# Patient Record
Sex: MALE | Race: ASIAN | Employment: OTHER | URBAN - METROPOLITAN AREA
[De-identification: names, ages, dates, MRNs, and addresses within clinical notes are randomized per-mention and may not be internally consistent; named-entity substitution may affect disease eponyms.]

---

## 2020-08-31 ENCOUNTER — TELEPHONE (OUTPATIENT)
Dept: CASE MANAGEMENT | Age: 70
End: 2020-08-31

## 2020-10-02 ENCOUNTER — TELEPHONE (OUTPATIENT)
Dept: CASE MANAGEMENT | Age: 70
End: 2020-10-02

## 2021-10-22 ENCOUNTER — LAB ENCOUNTER (OUTPATIENT)
Dept: LAB | Facility: HOSPITAL | Age: 71
End: 2021-10-22
Attending: INTERNAL MEDICINE
Payer: MEDICARE

## 2021-10-22 ENCOUNTER — OFFICE VISIT (OUTPATIENT)
Dept: INTERNAL MEDICINE CLINIC | Facility: CLINIC | Age: 71
End: 2021-10-22
Payer: MEDICARE

## 2021-10-22 VITALS
HEIGHT: 72 IN | OXYGEN SATURATION: 96 % | HEART RATE: 74 BPM | DIASTOLIC BLOOD PRESSURE: 78 MMHG | BODY MASS INDEX: 29.66 KG/M2 | SYSTOLIC BLOOD PRESSURE: 132 MMHG | WEIGHT: 219 LBS | TEMPERATURE: 99 F

## 2021-10-22 DIAGNOSIS — Z12.5 PROSTATE CANCER SCREENING: ICD-10-CM

## 2021-10-22 DIAGNOSIS — Z00.00 GENERAL MEDICAL EXAM: ICD-10-CM

## 2021-10-22 DIAGNOSIS — H53.8 BLURRED VISION: ICD-10-CM

## 2021-10-22 DIAGNOSIS — Z85.048 HISTORY OF RECTAL CANCER: Primary | ICD-10-CM

## 2021-10-22 DIAGNOSIS — N28.1 RENAL CYST: ICD-10-CM

## 2021-10-22 DIAGNOSIS — H93.13 TINNITUS OF BOTH EARS: ICD-10-CM

## 2021-10-22 DIAGNOSIS — Z23 NEEDS FLU SHOT: ICD-10-CM

## 2021-10-22 DIAGNOSIS — L85.3 DRY SKIN: ICD-10-CM

## 2021-10-22 PROCEDURE — 36415 COLL VENOUS BLD VENIPUNCTURE: CPT | Performed by: INTERNAL MEDICINE

## 2021-10-22 PROCEDURE — 85025 COMPLETE CBC W/AUTO DIFF WBC: CPT | Performed by: INTERNAL MEDICINE

## 2021-10-22 PROCEDURE — G0008 ADMIN INFLUENZA VIRUS VAC: HCPCS | Performed by: INTERNAL MEDICINE

## 2021-10-22 PROCEDURE — 80053 COMPREHEN METABOLIC PANEL: CPT | Performed by: INTERNAL MEDICINE

## 2021-10-22 PROCEDURE — 3078F DIAST BP <80 MM HG: CPT | Performed by: INTERNAL MEDICINE

## 2021-10-22 PROCEDURE — 90662 IIV NO PRSV INCREASED AG IM: CPT | Performed by: INTERNAL MEDICINE

## 2021-10-22 PROCEDURE — 83036 HEMOGLOBIN GLYCOSYLATED A1C: CPT | Performed by: INTERNAL MEDICINE

## 2021-10-22 PROCEDURE — 3008F BODY MASS INDEX DOCD: CPT | Performed by: INTERNAL MEDICINE

## 2021-10-22 PROCEDURE — 3075F SYST BP GE 130 - 139MM HG: CPT | Performed by: INTERNAL MEDICINE

## 2021-10-22 PROCEDURE — 99204 OFFICE O/P NEW MOD 45 MIN: CPT | Performed by: INTERNAL MEDICINE

## 2021-10-22 NOTE — PROGRESS NOTES
HPI:    Patient ID: Vandana Durand is a 73year old male. HPI    Patient is her to establish care. He is prediabetic,  has history of rectal Ca in 2013, s/p colorectal surgery , in remission. He had left renal stone.   He had medical care at [facility] Skin:        Dry lips   Neurological: Negative for dizziness. Psychiatric/Behavioral: Negative for behavioral problems and sleep disturbance. No current outpatient medications on file.      Allergies:Not on File   PHYSICAL EXAM:      10/22/2 deficit present. Mental Status: He is alert and oriented to person, place, and time.       Gait: Gait normal.   Psychiatric:         Judgment: Judgment normal.                 ASSESSMENT/PLAN:     History of rectal Ca  Obtain old records including rece

## 2021-10-23 PROBLEM — Z85.048 HISTORY OF RECTAL CANCER: Status: ACTIVE | Noted: 2021-10-23

## 2021-10-23 PROBLEM — N28.1 RENAL CYST: Status: ACTIVE | Noted: 2021-10-23

## 2021-10-29 ENCOUNTER — OFFICE VISIT (OUTPATIENT)
Dept: OTOLARYNGOLOGY | Facility: CLINIC | Age: 71
End: 2021-10-29
Payer: MEDICARE

## 2021-10-29 VITALS — WEIGHT: 219 LBS | BODY MASS INDEX: 29.66 KG/M2 | HEIGHT: 72 IN

## 2021-10-29 DIAGNOSIS — H61.23 BILATERAL IMPACTED CERUMEN: Primary | ICD-10-CM

## 2021-10-29 PROCEDURE — 3008F BODY MASS INDEX DOCD: CPT | Performed by: OTOLARYNGOLOGY

## 2021-10-29 PROCEDURE — 69210 REMOVE IMPACTED EAR WAX UNI: CPT | Performed by: OTOLARYNGOLOGY

## 2021-10-29 NOTE — PROGRESS NOTES
Tosha Farias is a 73year old male.   Patient presents with:  Ear Problem: Pt is here for ear cleaning       HISTORY OF PRESENT ILLNESS    Patient presents for cerumen removal. No other complaints or concerns at this time    Social History    Socioeconomic H Normal, Left: Normal.   Skin Normal Inspection - Normal.                              Canals:  Right: Canal reveals cerumen impaction,   Left: Canal reveals cerumen impaction,     Tympanic Membranes:  Right: Normal tympanic membrane.    Left: Normal tympani

## 2021-11-01 ENCOUNTER — TELEPHONE (OUTPATIENT)
Dept: INTERNAL MEDICINE CLINIC | Facility: CLINIC | Age: 71
End: 2021-11-01

## 2021-11-01 NOTE — TELEPHONE ENCOUNTER
Spouse came to the office stating husbands test results are incomplete and would pcp to call her back.

## 2021-11-02 ENCOUNTER — HOSPITAL ENCOUNTER (OUTPATIENT)
Dept: ULTRASOUND IMAGING | Facility: HOSPITAL | Age: 71
Discharge: HOME | End: 2021-11-02
Attending: INTERNAL MEDICINE
Payer: MEDICARE

## 2021-11-02 DIAGNOSIS — N28.1 RENAL CYST: ICD-10-CM

## 2021-11-02 PROCEDURE — 76770 US EXAM ABDO BACK WALL COMP: CPT | Performed by: INTERNAL MEDICINE

## 2021-11-03 ENCOUNTER — LAB ENCOUNTER (OUTPATIENT)
Dept: LAB | Facility: REFERENCE LAB | Age: 71
End: 2021-11-03
Attending: INTERNAL MEDICINE
Payer: MEDICARE

## 2021-11-03 PROCEDURE — 80061 LIPID PANEL: CPT | Performed by: INTERNAL MEDICINE

## 2021-11-03 PROCEDURE — 36415 COLL VENOUS BLD VENIPUNCTURE: CPT | Performed by: INTERNAL MEDICINE

## 2021-11-16 ENCOUNTER — TELEPHONE (OUTPATIENT)
Dept: INTERNAL MEDICINE CLINIC | Facility: CLINIC | Age: 71
End: 2021-11-16

## 2021-11-16 NOTE — TELEPHONE ENCOUNTER
----- Message from Radha Lockett MD sent at 11/7/2021  4:06 PM CST -----  Please call. Renal ultrasound showed small kidney stone. It is non-obstructive. It is important to maintain adequate hydration. He has 8 mm simple cyst. We will monitor.

## 2021-11-19 NOTE — TELEPHONE ENCOUNTER
Encounter from last week--    Patient and wife came in wanting results release to them by  in mandarin. Patient wife wanted only results given by Norah Campos and able to right down the results in mandarin on the printed result sheet.  Nurse handed the

## 2021-12-07 ENCOUNTER — OFFICE VISIT (OUTPATIENT)
Dept: INTERNAL MEDICINE CLINIC | Facility: CLINIC | Age: 71
End: 2021-12-07
Payer: MEDICARE

## 2021-12-07 VITALS
SYSTOLIC BLOOD PRESSURE: 110 MMHG | DIASTOLIC BLOOD PRESSURE: 70 MMHG | HEART RATE: 74 BPM | HEIGHT: 72 IN | OXYGEN SATURATION: 95 % | BODY MASS INDEX: 29.8 KG/M2 | WEIGHT: 220 LBS

## 2021-12-07 DIAGNOSIS — N20.0 RENAL CALCULUS: ICD-10-CM

## 2021-12-07 DIAGNOSIS — Z00.00 ENCOUNTER FOR ANNUAL HEALTH EXAMINATION: Primary | ICD-10-CM

## 2021-12-07 DIAGNOSIS — Z85.048 HISTORY OF RECTAL CANCER: ICD-10-CM

## 2021-12-07 DIAGNOSIS — N28.1 RENAL CYST: ICD-10-CM

## 2021-12-07 PROCEDURE — 3074F SYST BP LT 130 MM HG: CPT | Performed by: INTERNAL MEDICINE

## 2021-12-07 PROCEDURE — 96160 PT-FOCUSED HLTH RISK ASSMT: CPT | Performed by: INTERNAL MEDICINE

## 2021-12-07 PROCEDURE — 3078F DIAST BP <80 MM HG: CPT | Performed by: INTERNAL MEDICINE

## 2021-12-07 PROCEDURE — G0438 PPPS, INITIAL VISIT: HCPCS | Performed by: INTERNAL MEDICINE

## 2021-12-07 PROCEDURE — G0009 ADMIN PNEUMOCOCCAL VACCINE: HCPCS | Performed by: INTERNAL MEDICINE

## 2021-12-07 PROCEDURE — 90732 PPSV23 VACC 2 YRS+ SUBQ/IM: CPT | Performed by: INTERNAL MEDICINE

## 2021-12-07 PROCEDURE — 3008F BODY MASS INDEX DOCD: CPT | Performed by: INTERNAL MEDICINE

## 2021-12-07 PROCEDURE — 99397 PER PM REEVAL EST PAT 65+ YR: CPT | Performed by: INTERNAL MEDICINE

## 2021-12-08 NOTE — PROGRESS NOTES
HPI:    Patient ID: Jadon Fraga is a 73year old male. HPI     Luh Pain is  prediabetic,  has history of rectal Ca in 2013, s/p colorectal surgery , in remission.  He had left renal stone      Dr. Naseem Melvin heme onc    CEA slightly elevated      No curr

## 2021-12-10 NOTE — PATIENT INSTRUCTIONS
[facility] SCREENING SCHEDULE   Tests on this list are recommended by your physician but may not be covered, or covered at this frequency, by your insurer. Please check with your insurance carrier before scheduling to verify coverage.    PREVENTATIVE [id number]) covered once after 65 Prevnar 13: -    [id number]: 12/07/2021     No recommendations at this time    Hepatitis B One screening covered for patients with certain risk factors   -  No recommendations at this time    Tetanus Toxoid Not covered b Cardiovascular Disease Screening    Lipid Panel  Cholesterol  Lipoprotein (HDL)  Triglycerides Covered every 5 years for all Medicare beneficiaries without apparent signs or symptoms of cardiovascular disease Lab Results   Component Value Date    CHOLEST Never done        Recommended Websites for Advanced Directives    [employer]. org/publications/Documents/personal_dec. pdf  An information packet, including necessary form from the [address] website. http://www. [place]state. [place]/

## 2021-12-10 NOTE — PROGRESS NOTES
HPI:   Merrilee Osgood is a 73year old male who presents for a Medicare Subsequent Annual Wellness visit (Pt already had Initial Annual Wellness). Patient is a retired . H/o rectal ca in 2013, Status post resection.  Followed by Oncologist, standard forms performed Face to Face with patient and Family/surrogate (if present), and forms available to patient in AVS       He has never smoked tobacco.    CAGE Alcohol Screen Not needed    Cage screening not needed because reported NO to Alcohol use pain   LUNGS: denies shortness of breath with exertion  CARDIOVASCULAR: denies chest pain on exertion  GI: denies abdominal pain, denies heartburn  : 0 per night nocturia, no complaint of urinary incontinence  MUSCULOSKELETAL: denies back pain  NEURO: de texture, turgor normal, no rashes or lesions   Lymph nodes: Cervical, supraclavicular, and axillary nodes normal   Neurologic: Normal            Vaccination History     Immunization History   Administered Date(s) Administered   • Covid-19 Vaccine [employer] Documentation:   [facility] SCREENING SCHEDULE   Tests on this list are recommended by your physician but may not be covered, or covered at this frequency, by your insurer. Please check with your insurance carrier before scheduling to verify coverage. ([id number] & [id number]) covered once after 65 Prevnar 13: -    [id number]: 12/07/2021     No recommendations at this time    Hepatitis B One screening covered for patients with certain risk factors   -  No recommendations at this time    Tetanus Toxoid

## 2022-01-12 ENCOUNTER — VIRTUAL PHONE E/M (OUTPATIENT)
Dept: INTERNAL MEDICINE CLINIC | Facility: CLINIC | Age: 72
End: 2022-01-12
Payer: MEDICARE

## 2022-01-12 DIAGNOSIS — U07.1 TELEHEALTH ENCOUNTER FOR CONFIRMED COVID-19: Primary | ICD-10-CM

## 2022-01-12 PROCEDURE — 99442 PHONE E/M BY PHYS 11-20 MIN: CPT | Performed by: INTERNAL MEDICINE

## 2022-01-14 NOTE — PROGRESS NOTES
HPI:    Patient ID: Sade Treviño is a 73year old male. HPI     Virtual/Telephone Check-In    Sade Treviño verbally consents to a Virtual/Telephone Check-In service on 01/12//22.   Patient has been referred to the [facility] website at [email or web address] t Negative for dysuria. Musculoskeletal: Positive for myalgias. Neurological: Negative for headaches. Hematological: Negative for adenopathy. Psychiatric/Behavioral: Negative for behavioral problems.              No current outpatient medications on f the primary series of Pfizer or Moderna vaccine over 6 months ago and are not boosted  OR  Completed the primary series of J&J over 2 months ago and are not boosted  OR  Are unvaccinated    • Stay home for 5 days.  After that continue to wear a mask around

## 2022-01-19 ENCOUNTER — OFFICE VISIT (OUTPATIENT)
Dept: OPHTHALMOLOGY | Facility: CLINIC | Age: 72
End: 2022-01-19
Payer: MEDICARE

## 2022-01-19 DIAGNOSIS — H25.13 AGE-RELATED NUCLEAR CATARACT OF BOTH EYES: Primary | ICD-10-CM

## 2022-01-19 PROCEDURE — 92015 DETERMINE REFRACTIVE STATE: CPT | Performed by: OPHTHALMOLOGY

## 2022-01-19 PROCEDURE — 92004 COMPRE OPH EXAM NEW PT 1/>: CPT | Performed by: OPHTHALMOLOGY

## 2022-01-19 NOTE — PATIENT INSTRUCTIONS
Age-related nuclear cataract of both eyes  Discussed mild cataracts in both eyes that are not affecting vision and are not surgical at this time. Suggest +2.00 over the counter for reading.     Reassured patient that other than very early cataracts, eyes

## 2022-01-19 NOTE — ASSESSMENT & PLAN NOTE
Discussed mild cataracts in both eyes that are not affecting vision and are not surgical at this time. Suggest +2.00 over the counter for reading.     Reassured patient that other than very early cataracts, eyes look very healthy; there is no evidence of

## 2022-01-19 NOTE — PROGRESS NOTES
Sade Treviño is a 73year old male. HPI:     HPI     Consult     Comments: Per Dr. Yakov Christianson     NP/ pt states that he has never had an eye exam.   He is currently wearing OTC reading glasses.  Pt denies any blurred vision at distance and h Lamp Exam       Right Left    Lids/Lashes Dermatochalasis, Meibomian gland dysfunction Dermatochalasis, Meibomian gland dysfunction    Conjunctiva/Sclera Nasal/temp pinguecula Temp pinguecula    Cornea Clear Clear    Anterior Chamber Deep and quiet Deep an

## 2022-04-27 ENCOUNTER — OFFICE VISIT (OUTPATIENT)
Dept: INTERNAL MEDICINE CLINIC | Facility: CLINIC | Age: 72
End: 2022-04-27
Payer: MEDICARE

## 2022-04-27 VITALS
DIASTOLIC BLOOD PRESSURE: 74 MMHG | HEIGHT: 72 IN | BODY MASS INDEX: 29.8 KG/M2 | WEIGHT: 220 LBS | HEART RATE: 80 BPM | OXYGEN SATURATION: 96 % | SYSTOLIC BLOOD PRESSURE: 124 MMHG

## 2022-04-27 DIAGNOSIS — R73.03 PREDIABETES: ICD-10-CM

## 2022-04-27 DIAGNOSIS — E78.5 DYSLIPIDEMIA: ICD-10-CM

## 2022-04-27 DIAGNOSIS — E66.3 OVERWEIGHT (BMI 25.0-29.9): Primary | ICD-10-CM

## 2022-04-27 DIAGNOSIS — H25.13 AGE-RELATED NUCLEAR CATARACT OF BOTH EYES: ICD-10-CM

## 2022-04-27 DIAGNOSIS — Z85.048 HISTORY OF RECTAL CANCER: ICD-10-CM

## 2022-04-27 PROCEDURE — 3008F BODY MASS INDEX DOCD: CPT | Performed by: FAMILY MEDICINE

## 2022-04-27 PROCEDURE — 3074F SYST BP LT 130 MM HG: CPT | Performed by: FAMILY MEDICINE

## 2022-04-27 PROCEDURE — 3078F DIAST BP <80 MM HG: CPT | Performed by: FAMILY MEDICINE

## 2022-04-27 PROCEDURE — 99204 OFFICE O/P NEW MOD 45 MIN: CPT | Performed by: FAMILY MEDICINE

## 2022-04-27 RX ORDER — DIMENHYDRINATE 50 MG
TABLET ORAL
COMMUNITY

## 2022-04-27 RX ORDER — MELATONIN
1000 DAILY
COMMUNITY

## 2022-04-27 NOTE — ASSESSMENT & PLAN NOTE
Very mildly elevated LDL. Elevated ASCVD risk score. Will repeat next year - consider CT calcium score at that time.

## 2022-06-20 ENCOUNTER — HOSPITAL ENCOUNTER (OUTPATIENT)
Dept: GENERAL RADIOLOGY | Age: 72
Discharge: HOME | End: 2022-06-20
Attending: UROLOGY
Payer: MEDICARE

## 2022-06-20 ENCOUNTER — OFFICE VISIT (OUTPATIENT)
Dept: SURGERY | Facility: CLINIC | Age: 72
End: 2022-06-20
Payer: MEDICARE

## 2022-06-20 VITALS
BODY MASS INDEX: 30 KG/M2 | HEART RATE: 69 BPM | DIASTOLIC BLOOD PRESSURE: 74 MMHG | WEIGHT: 220 LBS | SYSTOLIC BLOOD PRESSURE: 132 MMHG

## 2022-06-20 DIAGNOSIS — N20.0 KIDNEY STONE ON LEFT SIDE: Primary | ICD-10-CM

## 2022-06-20 DIAGNOSIS — N28.1 RENAL CYST: ICD-10-CM

## 2022-06-20 DIAGNOSIS — N20.0 KIDNEY STONE ON LEFT SIDE: ICD-10-CM

## 2022-06-20 LAB
APPEARANCE: CLEAR
BILIRUBIN: NEGATIVE
GLUCOSE (URINE DIPSTICK): NEGATIVE MG/DL
KETONES (URINE DIPSTICK): NEGATIVE MG/DL
LEUKOCYTES: NEGATIVE
MULTISTIX LOT#: NORMAL NUMERIC
NITRITE, URINE: NEGATIVE
OCCULT BLOOD: NEGATIVE
PH, URINE: 7 (ref 4.5–8)
PROTEIN (URINE DIPSTICK): NEGATIVE MG/DL
SPECIFIC GRAVITY: 1.02 (ref 1–1.03)
URINE-COLOR: YELLOW
UROBILINOGEN,SEMI-QN: 1 MG/DL (ref 0–1.9)

## 2022-06-20 PROCEDURE — 81003 URINALYSIS AUTO W/O SCOPE: CPT | Performed by: UROLOGY

## 2022-06-20 PROCEDURE — 99204 OFFICE O/P NEW MOD 45 MIN: CPT | Performed by: UROLOGY

## 2022-06-20 PROCEDURE — 3075F SYST BP GE 130 - 139MM HG: CPT | Performed by: UROLOGY

## 2022-06-20 PROCEDURE — 3078F DIAST BP <80 MM HG: CPT | Performed by: UROLOGY

## 2022-06-20 PROCEDURE — 74018 RADEX ABDOMEN 1 VIEW: CPT | Performed by: UROLOGY

## 2022-07-01 ENCOUNTER — HOSPITAL ENCOUNTER (OUTPATIENT)
Dept: CT IMAGING | Facility: HOSPITAL | Age: 72
Discharge: HOME | End: 2022-07-01
Attending: UROLOGY
Payer: MEDICARE

## 2022-07-01 DIAGNOSIS — N20.0 KIDNEY STONE ON LEFT SIDE: ICD-10-CM

## 2022-07-01 PROCEDURE — 74176 CT ABD & PELVIS W/O CONTRAST: CPT | Performed by: UROLOGY

## 2023-02-04 ENCOUNTER — TELEPHONE (OUTPATIENT)
Dept: INTERNAL MEDICINE CLINIC | Facility: CLINIC | Age: 73
End: 2023-02-04

## 2023-02-04 NOTE — TELEPHONE ENCOUNTER
Called pt to remind him that its time for his physical, pt stated that he has a new provider Dr Kristy Benitezlist

## (undated) NOTE — LETTER
January 19, 2022    Tracy Atkins, [facility]     Patient: Carmina Session   YOB: 1950   Date of Visit: 1/19/2022       Dear Dr. Nico Thomas MD:    Thank you for referring Carmina Session to me for evaluation. Pupils    Right PERRL    Left PERRL          Visual Fields       Left Right     Full Full          Extraocular Movement       Right Left     Full, Ortho Full, Ortho          Dilation     Both eyes: 1.0% Mydriacyl and 2.5% Cole Synephrine @ 9:59 AM instructions:  Return in about 1 year (around 1/19/2023) for Complete eye exam.    1/19/2022  Scribed by: Frnatz Pozo MD        If you have questions, please do not hesitate to call me. I look forward to following Marybel Clifford along with you.     Sincerely,